# Patient Record
Sex: MALE | Race: WHITE | NOT HISPANIC OR LATINO | URBAN - METROPOLITAN AREA
[De-identification: names, ages, dates, MRNs, and addresses within clinical notes are randomized per-mention and may not be internally consistent; named-entity substitution may affect disease eponyms.]

---

## 2024-01-04 ENCOUNTER — OFFICE VISIT (OUTPATIENT)
Dept: DERMATOLOGY | Facility: CLINIC | Age: 44
End: 2024-01-04
Payer: COMMERCIAL

## 2024-01-04 DIAGNOSIS — D18.01 HEMANGIOMA OF SKIN: ICD-10-CM

## 2024-01-04 PROCEDURE — 99204 OFFICE O/P NEW MOD 45 MIN: CPT

## 2024-01-04 PROCEDURE — 11310 SHAVE SKIN LESION 0.5 CM/<: CPT

## 2024-01-04 PROCEDURE — 88305 TISSUE EXAM BY PATHOLOGIST: CPT | Performed by: DERMATOLOGY

## 2024-01-04 PROCEDURE — 88305 TISSUE EXAM BY PATHOLOGIST: CPT | Mod: TC,DER

## 2024-01-04 NOTE — PROGRESS NOTES
Subjective   HPI: Selena Cobos is a 43 y.o. male is a new patient here for evaluation and treatment of 2 enlarging erythematous papules on the left Maller cheek near the nasal bridge.  Patient has had these for many years however over the last year they both have been enlarging.  Sometimes they spontaneously bleed.  When they begin bleeding it is hard to get them to stop bleeding.  The 1 that is more superior is seen in his vision due to its size now.    ROS: No other skin or systemic complaints other than what is documented elsewhere in the note.    ALLERGIES: Patient has no allergy information on record.    SOCIAL:  has no history on file for tobacco use, alcohol use, and drug use.    Objective   Left Malar Cheek, Left Malar Cheek Upper  X2 0.5 cm erythematous-purplish papules        Assessment/Plan   1. Hemangioma of skin (2)  Left Malar Cheek; Left Malar Cheek Upper    Discussed benign nature of hemangiomas with patient.  Discussed with patient that since the areas appear clinically inflamed we could do shave removal.  The one that is more superior he is now seeing in his vision.    I discussed removal with patient.  Patient verbalizes understanding and opts for removal today.  I discussed wound care with patient.    Shave removal - Left Malar Cheek, Left Malar Cheek Upper    Specimen 1 - Dermatopathology- DERM LAB  Differential Diagnosis: Hemangioma   Check Margins Yes/No?:    Comments:    Dermpath Lab: Routine Histopathology (formalin-fixed tissue)    Specimen 2 - Dermatopathology- [facility]M LAB  Differential Diagnosis: Hemangioma   Check Margins Yes/No?:    Comments:    Dermpath Lab: Routine Histopathology (formalin-fixed tissue)         FOLLOW UP: 1 week suture removal    The patient was encouraged to contact me with any further questions or concerns.  Flory Yin PA-C  1/6/2024

## 2024-01-08 LAB
LABORATORY COMMENT REPORT: NORMAL
PATH REPORT.FINAL DX SPEC: NORMAL
PATH REPORT.GROSS SPEC: NORMAL
PATH REPORT.MICROSCOPIC SPEC OTHER STN: NORMAL
PATH REPORT.RELEVANT HX SPEC: NORMAL
PATH REPORT.TOTAL CANCER: NORMAL

## 2024-01-15 ENCOUNTER — OFFICE VISIT (OUTPATIENT)
Dept: DERMATOLOGY | Facility: CLINIC | Age: 44
End: 2024-01-15
Payer: COMMERCIAL

## 2024-01-15 DIAGNOSIS — Z48.02 VISIT FOR SUTURE REMOVAL: Primary | ICD-10-CM

## 2024-01-15 PROCEDURE — 99212 OFFICE O/P EST SF 10 MIN: CPT

## 2024-01-15 NOTE — PROGRESS NOTES
Subjective   HPI: Selena Cobos is a 43 y.o. male is here for suture removal and biopsy results [id number].  No concerns.    ROS: No other skin or systemic complaints other than what is documented elsewhere in the note.    ALLERGIES: Patient has no known allergies.    SOCIAL:  has no history on file for tobacco use, alcohol use, and drug use.    Specialty Problems    None      Objective   Head - Anterior (Face)  The wound is well-approximated and has healed nicely        Assessment/Plan   1. Visit for suture removal  Head - Anterior (Face)    The area has healed nicely.  Biopsy results [id number] showed a benign hemangioma and AV hemangioma.         FOLLOW UP: As needed    The patient was encouraged to contact me with any further questions or concerns.  Flory Yin PA-C  1/15/2024  Describes a